# Patient Record
Sex: FEMALE | Race: WHITE | NOT HISPANIC OR LATINO | URBAN - METROPOLITAN AREA
[De-identification: names, ages, dates, MRNs, and addresses within clinical notes are randomized per-mention and may not be internally consistent; named-entity substitution may affect disease eponyms.]

---

## 2022-04-30 ENCOUNTER — TELEPHONE ENCOUNTER (OUTPATIENT)
Dept: URBAN - METROPOLITAN AREA CLINIC 121 | Facility: CLINIC | Age: 57
End: 2022-04-30

## 2022-04-30 RX ORDER — ESZOPICLONE 3 MG/1
TABLET, COATED ORAL
OUTPATIENT
Start: 2012-01-24

## 2022-04-30 RX ORDER — HYOSCYAMINE SULFATE 0.12 MG/1
TAKE 1 TAB PO Q6H PRN TABLET ORAL
OUTPATIENT
Start: 2012-04-23 | End: 2014-08-22

## 2022-04-30 RX ORDER — LEVOTHYROXINE SODIUM 137 UG/1
TABLET ORAL
OUTPATIENT
Start: 2012-01-24

## 2022-04-30 RX ORDER — HYDROCORTISONE 25 MG/G
INSERT PR TWICE A DAY CREAM TOPICAL
OUTPATIENT
Start: 2012-04-16 | End: 2014-08-22

## 2022-04-30 RX ORDER — HYOSCYAMINE SULFATE 0.12 MG/1
TABLET, ORALLY DISINTEGRATING ORAL
OUTPATIENT
Start: 2012-02-29

## 2022-04-30 RX ORDER — DULOXETINE HCL 20 MG
CAPSULE,DELAYED RELEASE (ENTERIC COATED) ORAL
OUTPATIENT
Start: 2012-01-24 | End: 2014-08-22

## 2022-04-30 RX ORDER — HYDROCORTISONE 25 MG/G
INSERT PR TWICE A DAY CREAM TOPICAL
OUTPATIENT
Start: 2012-04-16

## 2022-04-30 RX ORDER — HYOSCYAMINE SULFATE 0.12 MG/1
TAKE 1 TAB PO Q6H PRN TABLET ORAL
OUTPATIENT
Start: 2012-04-23

## 2022-04-30 RX ORDER — DULOXETINE HCL 20 MG
CAPSULE,DELAYED RELEASE (ENTERIC COATED) ORAL
OUTPATIENT
Start: 2012-01-24

## 2022-04-30 RX ORDER — HYOSCYAMINE SULFATE 0.12 MG/1
TABLET, ORALLY DISINTEGRATING ORAL
OUTPATIENT
Start: 2012-02-29 | End: 2014-08-22

## 2022-04-30 RX ORDER — ESZOPICLONE 2 MG
TABLET ORAL
OUTPATIENT
Start: 2014-08-22

## 2022-05-01 ENCOUNTER — TELEPHONE ENCOUNTER (OUTPATIENT)
Dept: URBAN - METROPOLITAN AREA CLINIC 121 | Facility: CLINIC | Age: 57
End: 2022-05-01

## 2022-05-01 RX ORDER — ESZOPICLONE 2 MG
TABLET ORAL
Status: ACTIVE | COMMUNITY
Start: 2014-08-22

## 2022-05-01 RX ORDER — BUPROPION HCL 300 MG
TABLET, EXTENDED RELEASE 24 HR ORAL
Status: ACTIVE | COMMUNITY
Start: 2014-08-22

## 2022-05-01 RX ORDER — LEVOTHYROXINE SODIUM 100 MCG
TABLET ORAL
Status: ACTIVE | COMMUNITY
Start: 2014-08-22

## 2022-05-01 RX ORDER — METOPROLOL TARTRATE 100 MG/1
TABLET, FILM COATED ORAL
Status: ACTIVE | COMMUNITY
Start: 2012-01-24

## 2022-05-01 RX ORDER — LINACLOTIDE 145 UG/1
1 CAPSULE PO QAM CAPSULE, GELATIN COATED ORAL
Status: ACTIVE | COMMUNITY
Start: 2014-10-07

## 2023-12-02 ENCOUNTER — OUT OF OFFICE VISIT (OUTPATIENT)
Dept: URBAN - METROPOLITAN AREA SURGERY CENTER 7 | Facility: SURGERY CENTER | Age: 58
End: 2023-12-02

## 2023-12-13 ENCOUNTER — CLAIMS CREATED FROM THE CLAIM WINDOW (OUTPATIENT)
Dept: URBAN - METROPOLITAN AREA CLINIC 27 | Facility: CLINIC | Age: 58
End: 2023-12-13
Payer: COMMERCIAL

## 2023-12-13 ENCOUNTER — OFFICE VISIT (OUTPATIENT)
Dept: URBAN - METROPOLITAN AREA CLINIC 27 | Facility: CLINIC | Age: 58
End: 2023-12-13

## 2023-12-13 ENCOUNTER — DASHBOARD ENCOUNTERS (OUTPATIENT)
Age: 58
End: 2023-12-13

## 2023-12-13 VITALS
SYSTOLIC BLOOD PRESSURE: 134 MMHG | HEIGHT: 65 IN | BODY MASS INDEX: 27.82 KG/M2 | HEART RATE: 59 BPM | WEIGHT: 167 LBS | DIASTOLIC BLOOD PRESSURE: 99 MMHG

## 2023-12-13 DIAGNOSIS — Z86.010 PERSONAL HISTORY OF COLONIC POLYPS: ICD-10-CM

## 2023-12-13 DIAGNOSIS — K59.01 CONSTIPATION: ICD-10-CM

## 2023-12-13 PROBLEM — 428283002: Status: ACTIVE | Noted: 2023-12-13

## 2023-12-13 PROCEDURE — 99204 OFFICE O/P NEW MOD 45 MIN: CPT | Performed by: INTERNAL MEDICINE

## 2023-12-13 RX ORDER — METOPROLOL TARTRATE 100 MG/1
TABLET, FILM COATED ORAL
Status: ACTIVE | COMMUNITY
Start: 2012-01-24

## 2023-12-13 RX ORDER — BUPROPION HCL 300 MG
TABLET, EXTENDED RELEASE 24 HR ORAL
Status: ACTIVE | COMMUNITY
Start: 2014-08-22

## 2023-12-13 RX ORDER — LINACLOTIDE 145 UG/1
1 CAPSULE PO QAM CAPSULE, GELATIN COATED ORAL
Status: ACTIVE | COMMUNITY
Start: 2014-10-07

## 2023-12-13 RX ORDER — LEVOTHYROXINE SODIUM 100 MCG
TABLET ORAL
Status: ACTIVE | COMMUNITY
Start: 2014-08-22

## 2023-12-13 RX ORDER — ESZOPICLONE 2 MG
TABLET ORAL
Status: ACTIVE | COMMUNITY
Start: 2014-08-22

## 2023-12-13 NOTE — HPI-ZZZTODAY'S VISIT
Ms. Garrido is a 58-year-old female patient of Dr. Wilkes presenting for surveillance colonoscopy. . Colonoscopy 2014:1 cm adenomatous Sessile polyp in the ascending colon, small polyp in the transverse colon; recall 3 years

## 2023-12-13 NOTE — HPI-ZZZTODAY'S VISIT
Ms. Garrido is a 58-year-old female patient of Dr. Wilkes presenting for surveillance colonoscopy. She has had intermittent constipation since starting Wegovy in March; OTC laxatives tend to cause cramping, even Miralax. She has lost 60 lbs on Wegovy. No rectal bleeding. She has remote h/o breast cancer 15 yrs ago, in remission. . Colonoscopy 2014:1 cm adenomatous Sessile polyp in the ascending colon, small polyp in the transverse colon; recall 3 years  . FH: she is adopted, FH unknown

## 2024-02-01 ENCOUNTER — COLON (OUTPATIENT)
Dept: URBAN - METROPOLITAN AREA SURGERY CENTER 7 | Facility: SURGERY CENTER | Age: 59
End: 2024-02-01
Payer: COMMERCIAL

## 2024-02-01 ENCOUNTER — LAB (OUTPATIENT)
Dept: URBAN - METROPOLITAN AREA CLINIC 4 | Facility: CLINIC | Age: 59
End: 2024-02-01
Payer: COMMERCIAL

## 2024-02-01 DIAGNOSIS — D12.3 ADENOMA OF TRANSVERSE COLON: ICD-10-CM

## 2024-02-01 DIAGNOSIS — D12.2 ADENOMA OF ASCENDING COLON: ICD-10-CM

## 2024-02-01 DIAGNOSIS — Z12.11 COLON CANCER SCREENING: ICD-10-CM

## 2024-02-01 DIAGNOSIS — K63.5 BENIGN COLON POLYP: ICD-10-CM

## 2024-02-01 DIAGNOSIS — D12.0 BENIGN NEOPLASM OF CECUM: ICD-10-CM

## 2024-02-01 DIAGNOSIS — D12.0 ADENOMA OF CECUM: ICD-10-CM

## 2024-02-01 PROCEDURE — 88305 TISSUE EXAM BY PATHOLOGIST: CPT | Performed by: PATHOLOGY

## 2024-02-01 PROCEDURE — 45380 COLONOSCOPY AND BIOPSY: CPT | Performed by: INTERNAL MEDICINE

## 2024-02-01 PROCEDURE — 45385 COLONOSCOPY W/LESION REMOVAL: CPT | Performed by: INTERNAL MEDICINE

## 2024-02-01 RX ORDER — BUPROPION HCL 300 MG
TABLET, EXTENDED RELEASE 24 HR ORAL
Status: ACTIVE | COMMUNITY
Start: 2014-08-22

## 2024-02-01 RX ORDER — LEVOTHYROXINE SODIUM 100 MCG
TABLET ORAL
Status: ACTIVE | COMMUNITY
Start: 2014-08-22

## 2024-02-01 RX ORDER — METOPROLOL TARTRATE 100 MG/1
TABLET, FILM COATED ORAL
Status: ACTIVE | COMMUNITY
Start: 2012-01-24

## 2024-02-01 RX ORDER — ESZOPICLONE 2 MG
TABLET ORAL
Status: ACTIVE | COMMUNITY
Start: 2014-08-22

## 2024-02-01 RX ORDER — LINACLOTIDE 145 UG/1
1 CAPSULE PO QAM CAPSULE, GELATIN COATED ORAL
Status: ACTIVE | COMMUNITY
Start: 2014-10-07